# Patient Record
Sex: MALE | URBAN - METROPOLITAN AREA
[De-identification: names, ages, dates, MRNs, and addresses within clinical notes are randomized per-mention and may not be internally consistent; named-entity substitution may affect disease eponyms.]

---

## 2024-03-18 ENCOUNTER — OFFICE VISIT (OUTPATIENT)
Age: 49
End: 2024-03-18
Payer: COMMERCIAL

## 2024-03-18 VITALS
BODY MASS INDEX: 30.16 KG/M2 | OXYGEN SATURATION: 96 % | TEMPERATURE: 97.8 F | WEIGHT: 199 LBS | HEART RATE: 63 BPM | DIASTOLIC BLOOD PRESSURE: 79 MMHG | HEIGHT: 68 IN | RESPIRATION RATE: 17 BRPM | SYSTOLIC BLOOD PRESSURE: 130 MMHG

## 2024-03-18 DIAGNOSIS — K64.5 EXTERNAL HEMORRHOID, THROMBOSED: Primary | ICD-10-CM

## 2024-03-18 PROCEDURE — 99203 OFFICE O/P NEW LOW 30 MIN: CPT | Performed by: COLON & RECTAL SURGERY

## 2024-03-18 RX ORDER — ATORVASTATIN CALCIUM 10 MG/1
1 TABLET, FILM COATED ORAL NIGHTLY
COMMUNITY
Start: 2024-01-02

## 2024-03-18 RX ORDER — AMLODIPINE BESYLATE 10 MG/1
TABLET ORAL
COMMUNITY
Start: 2024-02-13

## 2024-03-18 RX ORDER — BENAZEPRIL HYDROCHLORIDE 10 MG/1
TABLET ORAL
COMMUNITY
Start: 2024-02-13

## 2024-03-18 NOTE — PROGRESS NOTES
HPI: Luis Estes is a 48 y.o. male presenting with chief complain of recent perianal lump and pain.  This began 3 weeks ago on a camping trip.  It is gradually resolved.  It was in the right posterior quadrant.  He denies bleeding, discharge, fevers.  He moves his bowels daily.  Denies constipation, diarrhea, incontinence or unexplained weight loss.  No family history of colon cancer.  Had a normal colonoscopy in 2021 with a 10-year recall.    Past Medical History:   Diagnosis Date    Hypertension        Past Surgical History:   Procedure Laterality Date    COLONOSCOPY  10/2021    normal    HERNIA REPAIR      TONSILLECTOMY         No family history on file.    Social History     Socioeconomic History    Marital status:      Spouse name: None    Number of children: None    Years of education: None    Highest education level: None   Tobacco Use    Smoking status: Never    Smokeless tobacco: Never   Substance and Sexual Activity    Alcohol use: Never    Drug use: Never    Sexual activity: Yes     Partners: Female       Current Outpatient Medications   Medication Instructions    amLODIPine (NORVASC) 10 MG tablet     atorvastatin (LIPITOR) 10 MG tablet 1 tablet, Oral, NIGHTLY    benazepril (LOTENSIN) 10 MG tablet         No Known Allergies    ROS: negative    Vitals:    03/18/24 1424   BP: 130/79   Pulse: 63   Resp: 17   Temp: 97.8 °F (36.6 °C)   SpO2: 96%       Physical Exam  Rectum: No significant external hemorrhoids or fissure  Digital rectal exam: Moderate tone, no mass    No results found for: \"WBC\", \"HGB\", \"HCT\", \"MCV\", \"PLT\"  No results found for: \"NA\", \"K\", \"CL\", \"CO2\", \"BUN\", \"CREATININE\", \"GLUCOSE\", \"CALCIUM\"   No results found for: \"MG\"  No results found for: \"CALCIUM\", \"PHOS\"    Assessment / Plan    Likely resolved thrombosed external hemorrhoid  Discussed preventative measures  Follow-up as needed    The diagnoses and plan were discussed with the patient. All questions answered.  Plan of care agreed